# Patient Record
Sex: FEMALE | Race: WHITE | HISPANIC OR LATINO | Employment: UNEMPLOYED | URBAN - METROPOLITAN AREA
[De-identification: names, ages, dates, MRNs, and addresses within clinical notes are randomized per-mention and may not be internally consistent; named-entity substitution may affect disease eponyms.]

---

## 2023-09-15 ENCOUNTER — OFFICE VISIT (OUTPATIENT)
Dept: OBSTETRICS AND GYNECOLOGY | Facility: CLINIC | Age: 23
End: 2023-09-15

## 2023-09-15 ENCOUNTER — PATIENT MESSAGE (OUTPATIENT)
Dept: OBSTETRICS AND GYNECOLOGY | Facility: CLINIC | Age: 23
End: 2023-09-15

## 2023-09-15 VITALS
DIASTOLIC BLOOD PRESSURE: 60 MMHG | HEIGHT: 60 IN | SYSTOLIC BLOOD PRESSURE: 124 MMHG | WEIGHT: 151.44 LBS | BODY MASS INDEX: 29.73 KG/M2

## 2023-09-15 DIAGNOSIS — E28.2 PCOS (POLYCYSTIC OVARIAN SYNDROME): ICD-10-CM

## 2023-09-15 DIAGNOSIS — Z23 NEED FOR HPV VACCINE: ICD-10-CM

## 2023-09-15 DIAGNOSIS — R87.612 LGSIL ON PAP SMEAR OF CERVIX: Primary | ICD-10-CM

## 2023-09-15 DIAGNOSIS — Z30.41 ENCOUNTER FOR SURVEILLANCE OF CONTRACEPTIVE PILLS: ICD-10-CM

## 2023-09-15 PROCEDURE — 99999 PR PBB SHADOW E&M-NEW PATIENT-LVL III: CPT | Mod: PBBFAC,,, | Performed by: NURSE PRACTITIONER

## 2023-09-15 PROCEDURE — 99999 PR PBB SHADOW E&M-NEW PATIENT-LVL III: ICD-10-PCS | Mod: PBBFAC,,, | Performed by: NURSE PRACTITIONER

## 2023-09-15 PROCEDURE — 99203 PR OFFICE/OUTPT VISIT, NEW, LEVL III, 30-44 MIN: ICD-10-PCS | Mod: S$PBB,,, | Performed by: NURSE PRACTITIONER

## 2023-09-15 PROCEDURE — 99203 OFFICE O/P NEW LOW 30 MIN: CPT | Mod: S$PBB,,, | Performed by: NURSE PRACTITIONER

## 2023-09-15 PROCEDURE — 99203 OFFICE O/P NEW LOW 30 MIN: CPT | Mod: PBBFAC,PN | Performed by: NURSE PRACTITIONER

## 2023-09-15 RX ORDER — METFORMIN HYDROCHLORIDE 500 MG/1
500 TABLET ORAL
COMMUNITY
Start: 2023-07-23

## 2023-09-15 RX ORDER — NORGESTIMATE AND ETHINYL ESTRADIOL 0.25-0.035
1 KIT ORAL DAILY
Qty: 84 TABLET | Refills: 3 | Status: SHIPPED | OUTPATIENT
Start: 2023-09-15

## 2023-09-15 RX ORDER — CABERGOLINE 0.5 MG/1
TABLET ORAL
COMMUNITY
Start: 2023-09-02

## 2023-09-15 RX ORDER — ERGOCALCIFEROL 1.25 MG/1
50000 CAPSULE ORAL
COMMUNITY
Start: 2023-07-23

## 2023-09-15 RX ORDER — NORGESTIMATE AND ETHINYL ESTRADIOL 0.25-0.035
1 KIT ORAL
COMMUNITY
Start: 2023-08-28 | End: 2023-09-15 | Stop reason: SDUPTHER

## 2023-09-19 NOTE — PROGRESS NOTES
Ronda Maren Vazquez is a 22 y.o. female No obstetric history on file.   Pt presents with paper work from gyn clinic in [place] - pt is [place] speaking only - refused dustin at first as boyfriend was with her and he speaks [place] and English but his English is limited.   Dustin was then brought into room to interpret and was used through out visit.     Per labs from [place] pt has had a pap and showed LGSIL changes.  She was told to have a colposcopy   She also has hx of irregular cycle and was placed on ocp, metformin, and Dostinex - labs do not reflect a prolactin level  Pt has had regular cycle since starting her medication but does complain of GI upset - educated on the metformin  Pt had one HPV vaccine done and is now due for #2 - offered to give to pt in office but due to no insurance boyfriend asked to do at there pharmacy as it is cheaper     LMP: Patient's last menstrual period was 08/18/2023 (approximate)..        History reviewed. No pertinent past medical history.  History reviewed. No pertinent surgical history.  Social History     Socioeconomic History    Marital status: Single     History reviewed. No pertinent family history.  OB History    No obstetric history on file.         /60   Ht 5' (1.524 m)   Wt 68.7 kg (151 lb 7.3 oz)   LMP 08/18/2023 (Approximate)   BMI 29.58 kg/m²       ROS:  Per hpi    PHYSICAL EXAM:  APPEARANCE: Well nourished, well developed, in no acute distress.  AFFECT: WNL, alert and oriented x 3  SKIN: No acne or hirsutism  Deferred    Physical Exam    1. LGSIL on Pap smear of cervix  (In Office Administered) HPV Vaccine (9-Valent) (3 Dose) (IM)      2. PCOS (polycystic ovarian syndrome)  GLUCOSE, FASTING    Insulin, Random    Follicle Stimulating Hormone    LUTEINIZING HORMONE    Prolactin    ESTARYLLA 0.25-35 mg-mcg per tablet      3. Need for HPV vaccine  (In Office Administered) HPV Vaccine (9-Valent) (3 Dose) (IM)      4. Encounter for surveillance of  contraceptive pills  ESTARYLLA 0.25-35 mg-mcg per tablet       AND PLAN:    Over 30 minutes was spent with pt trying to decipher why at gyn and why on the medications she is on  Appears they must think has PCOS from clinic in [place]  Explained and educated on pap smear and f/u per guidelines and her age did not need pap and does not need colpscopy at this time   Since has had pap will do repeat pap in one year   Recommend complete her HPV vaccine series - boyfriend is also getting  Will check labs with prolactin to see if needs to stay on Dostinex

## 2023-09-20 ENCOUNTER — LAB VISIT (OUTPATIENT)
Dept: LAB | Facility: HOSPITAL | Age: 23
End: 2023-09-20
Attending: NURSE PRACTITIONER

## 2023-09-20 DIAGNOSIS — E28.2 PCOS (POLYCYSTIC OVARIAN SYNDROME): ICD-10-CM

## 2023-09-20 LAB
FSH SERPL-ACNC: 2.57 MIU/ML
GLUCOSE SERPL-MCNC: 81 MG/DL (ref 70–110)
INSULIN COLLECTION INTERVAL: NORMAL
INSULIN SERPL-ACNC: 8 UU/ML
LH SERPL-ACNC: 2.6 MIU/ML
PROLACTIN SERPL IA-MCNC: 2.9 NG/ML (ref 5.2–26.5)

## 2023-09-20 PROCEDURE — 36415 COLL VENOUS BLD VENIPUNCTURE: CPT | Mod: PN | Performed by: NURSE PRACTITIONER

## 2023-09-20 PROCEDURE — 83525 ASSAY OF INSULIN: CPT | Performed by: NURSE PRACTITIONER

## 2023-09-20 PROCEDURE — 83001 ASSAY OF GONADOTROPIN (FSH): CPT | Performed by: NURSE PRACTITIONER

## 2023-09-20 PROCEDURE — 84146 ASSAY OF PROLACTIN: CPT | Performed by: NURSE PRACTITIONER

## 2023-09-20 PROCEDURE — 83002 ASSAY OF GONADOTROPIN (LH): CPT | Performed by: NURSE PRACTITIONER

## 2023-09-20 PROCEDURE — 82947 ASSAY GLUCOSE BLOOD QUANT: CPT | Performed by: NURSE PRACTITIONER

## 2023-09-21 ENCOUNTER — TELEPHONE (OUTPATIENT)
Dept: OBSTETRICS AND GYNECOLOGY | Facility: CLINIC | Age: 23
End: 2023-09-21

## 2023-09-21 NOTE — TELEPHONE ENCOUNTER
Yen Tobin, LEE ANN   2023  1:18 PM CDT       Will have to speak to boyfriend as pt does not speak English -Labs are normal other than her prolactin hormone is low - have her stop the cabergoline  Keep taking the birth control and the metformin to keep cycle regulated     Called patient with  #[phone] [facility] who verified her name and . The above results given to the patient.  Patient verbalized understanding and denied any further questions.

## 2023-09-21 NOTE — TELEPHONE ENCOUNTER
----- Message from Wayne Johns sent at 9/21/2023  3:22 PM CDT -----  Contact: [phone]  Type:  Patient Returning Call    Who Called:Ronda   Who Left Message for Patient:nurse   Does the patient know what this is regarding?:yes   Would the patient rather a call back or a response via [facility]? Call back   Best Call Back Number:[phone]  Additional Information: requesting an  with call back       Thanks SURAJ